# Patient Record
Sex: MALE | Race: OTHER
[De-identification: names, ages, dates, MRNs, and addresses within clinical notes are randomized per-mention and may not be internally consistent; named-entity substitution may affect disease eponyms.]

---

## 2019-04-06 ENCOUNTER — HOSPITAL ENCOUNTER (EMERGENCY)
Dept: HOSPITAL 74 - JER | Age: 34
Discharge: HOME | End: 2019-04-06
Payer: COMMERCIAL

## 2019-04-06 VITALS — DIASTOLIC BLOOD PRESSURE: 82 MMHG | HEART RATE: 89 BPM | SYSTOLIC BLOOD PRESSURE: 123 MMHG

## 2019-04-06 VITALS — BODY MASS INDEX: 29 KG/M2

## 2019-04-06 VITALS — TEMPERATURE: 98.6 F

## 2019-04-06 DIAGNOSIS — K52.9: Primary | ICD-10-CM

## 2019-04-06 LAB
ALBUMIN SERPL-MCNC: 4.3 G/DL (ref 3.4–5)
ALP SERPL-CCNC: 78 U/L (ref 45–117)
ALT SERPL-CCNC: 36 U/L (ref 13–61)
ANION GAP SERPL CALC-SCNC: 9 MMOL/L (ref 8–16)
AST SERPL-CCNC: 21 U/L (ref 15–37)
BASOPHILS # BLD: 0.3 % (ref 0–2)
BILIRUB SERPL-MCNC: 0.6 MG/DL (ref 0.2–1)
BUN SERPL-MCNC: 16 MG/DL (ref 7–18)
CALCIUM SERPL-MCNC: 9 MG/DL (ref 8.5–10.1)
CHLORIDE SERPL-SCNC: 101 MMOL/L (ref 98–107)
CO2 SERPL-SCNC: 28 MMOL/L (ref 21–32)
CREAT SERPL-MCNC: 0.9 MG/DL (ref 0.55–1.3)
DEPRECATED RDW RBC AUTO: 14 % (ref 11.9–15.9)
EOSINOPHIL # BLD: 0.4 % (ref 0–4.5)
GLUCOSE SERPL-MCNC: 154 MG/DL (ref 74–106)
HCT VFR BLD CALC: 43.8 % (ref 35.4–49)
HGB BLD-MCNC: 14.6 GM/DL (ref 11.7–16.9)
LIPASE SERPL-CCNC: 111 U/L (ref 73–393)
LYMPHOCYTES # BLD: 13 % (ref 8–40)
MCH RBC QN AUTO: 26.1 PG (ref 25.7–33.7)
MCHC RBC AUTO-ENTMCNC: 33.3 G/DL (ref 32–35.9)
MCV RBC: 78.4 FL (ref 80–96)
MONOCYTES # BLD AUTO: 5 % (ref 3.8–10.2)
NEUTROPHILS # BLD: 81.3 % (ref 42.8–82.8)
PLATELET # BLD AUTO: 303 K/MM3 (ref 134–434)
PMV BLD: 7.4 FL (ref 7.5–11.1)
POTASSIUM SERPLBLD-SCNC: 3.8 MMOL/L (ref 3.5–5.1)
PROT SERPL-MCNC: 7.8 G/DL (ref 6.4–8.2)
RBC # BLD AUTO: 5.59 M/MM3 (ref 4–5.6)
SODIUM SERPL-SCNC: 138 MMOL/L (ref 136–145)
WBC # BLD AUTO: 15.7 K/MM3 (ref 4–10)

## 2019-04-06 PROCEDURE — 3E033GC INTRODUCTION OF OTHER THERAPEUTIC SUBSTANCE INTO PERIPHERAL VEIN, PERCUTANEOUS APPROACH: ICD-10-PCS

## 2019-04-06 PROCEDURE — 3E033NZ INTRODUCTION OF ANALGESICS, HYPNOTICS, SEDATIVES INTO PERIPHERAL VEIN, PERCUTANEOUS APPROACH: ICD-10-PCS

## 2019-04-06 PROCEDURE — 3E0337Z INTRODUCTION OF ELECTROLYTIC AND WATER BALANCE SUBSTANCE INTO PERIPHERAL VEIN, PERCUTANEOUS APPROACH: ICD-10-PCS

## 2019-04-06 NOTE — PDOC
*Physical Exam





- Vital Signs


 Last Vital Signs











Temp Pulse Resp BP Pulse Ox


 


 97.4 F L  72   18   133/89   100 


 


 04/06/19 04:51  04/06/19 04:51  04/06/19 04:51  04/06/19 04:51  04/06/19 04:51














- Physical Exam


Comments: 





04/06/19 08:34


Patient received 1g of tylenol IV and another bag of NS. He reported feeling 

much better and would like to go home. 





Glen dc the patient with zofran PRN and encourage him to give plenty of bowel 

rest before moving onto liquid diet. Advance diet as needed after he tolerates 

liquid diet. 





<Umesh Moss - Last Filed: 04/06/19 08:36>





- Vital Signs


 Last Vital Signs











Temp Pulse Resp BP Pulse Ox


 


 98.6 F   89   19   123/82   100 


 


 04/06/19 08:50  04/06/19 08:49  04/06/19 08:49  04/06/19 08:49  04/06/19 08:49














<Danelle Pearson - Last Filed: 04/07/19 07:28>





ED Treatment Course





- LABORATORY


CBC & Chemistry Diagram: 


 04/06/19 05:20





 04/06/19 05:20





- ADDITIONAL ORDERS


Additional order review: 


 Laboratory  Results











  04/06/19





  05:20


 


Sodium  138


 


Potassium  3.8


 


Chloride  101


 


Carbon Dioxide  28


 


Anion Gap  9


 


BUN  16


 


Creatinine  0.9


 


Creat Clearance w eGFR  96.03


 


Random Glucose  154 H


 


Calcium  9.0


 


Total Bilirubin  0.6


 


AST  21


 


ALT  36


 


Alkaline Phosphatase  78


 


Total Protein  7.8


 


Albumin  4.3


 


Lipase  111








 











  04/06/19





  05:20


 


RBC  5.59


 


MCV  78.4 L


 


MCHC  33.3


 


RDW  14.0


 


MPV  7.4 L


 


Neutrophils %  81.3


 


Lymphocytes %  13.0


 


Monocytes %  5.0


 


Eosinophils %  0.4


 


Basophils %  0.3














- Medications


Given in the ED: 


ED Medications














Discontinued Medications














Generic Name Dose Route Start Last Admin





  Trade Name Freq  PRN Reason Stop Dose Admin


 


Acetaminophen  1,000 mg  04/06/19 07:31  04/06/19 07:42





  Ofirmev Injection -  IVPB  04/06/19 07:32  1,000 mg





  ONCE ONE   Administration





     





     





     





     


 


Famotidine/Sodium Chloride  20 mg in 50 mls @ 100 mls/hr  04/06/19 05:12  04/06/ 19 05:37





  Pepcid 20 Mg Premixed Ivpb -  IVPB  04/06/19 05:41  100 mls/hr





  ONCE ONE   Administration





     





     





     





     


 


Sodium Chloride  1,000 mls @ 1,000 mls/hr  04/06/19 07:17  04/06/19 07:42





  Normal Saline -  IV  04/06/19 08:16  1,000 mls/hr





  ASDIR STA   Administration





     





     





     





     


 


Ondansetron HCl  4 mg  04/06/19 05:13  04/06/19 05:37





  Zofran Injection  IVPUSH  04/06/19 05:14  4 mg





  ONCE ONE   Administration





     





     





     





     














<Umesh Moss - Last Filed: 04/06/19 08:36>





- LABORATORY


CBC & Chemistry Diagram: 


 04/06/19 05:20





 04/06/19 05:20





- ADDITIONAL ORDERS


Additional order review: 


 











  04/06/19





  05:20


 


RBC  5.59


 


MCV  78.4 L


 


MCHC  33.3


 


RDW  14.0


 


MPV  7.4 L


 


Neutrophils %  81.3


 


Lymphocytes %  13.0


 


Monocytes %  5.0


 


Eosinophils %  0.4


 


Basophils %  0.3














- Medications


Given in the ED: 


ED Medications














Discontinued Medications














Generic Name Dose Route Start Last Admin





  Trade Name Freq  PRN Reason Stop Dose Admin


 


Acetaminophen  1,000 mg  04/06/19 07:31  04/06/19 07:42





  Ofirmev Injection -  IVPB  04/06/19 07:32  1,000 mg





  ONCE ONE   Administration





     





     





     





     


 


Famotidine/Sodium Chloride  20 mg in 50 mls @ 100 mls/hr  04/06/19 05:12  04/06/ 19 05:37





  Pepcid 20 Mg Premixed Ivpb -  IVPB  04/06/19 05:41  100 mls/hr





  ONCE ONE   Administration





     





     





     





     


 


Sodium Chloride  1,000 mls @ 42 mls/hr  04/06/19 05:15  04/06/19 05:37





  Normal Saline -  IV   42 mls/hr





  ASDIR NERY   Administration





     





     





     





     


 


Sodium Chloride  1,000 mls @ 1,000 mls/hr  04/06/19 07:17  04/06/19 07:42





  Normal Saline -  IV  04/06/19 08:16  1,000 mls/hr





  ASDIR STA   Administration





     





     





     





     


 


Ondansetron HCl  4 mg  04/06/19 05:13  04/06/19 05:37





  Zofran Injection  IVPUSH  04/06/19 05:14  4 mg





  ONCE ONE   Administration





     





     





     





     














<Danelle Pearson Suzejordan - Last Filed: 04/07/19 07:28>





Medical Decision Making





- Medical Decision Making





04/07/19 07:26





pt was signed out from Dr Ryan at 7AM


reviewed HPI, labs and workup. 33 YOM c/o n/v/d, on reassessment, mild 

epigastric discomfort.


labs with leukocytosis of 15K, nonspecific, likely in the setting of AGE. 

abdomen nonperitonoeal, he is nontoxic appearing


given pepcid and fluids initially, improving. adding on tylenol for analgesia


on reassessment, VS wnl, no fever or hypotension or tachycardia


well appearing. leo PO intake


rx zofran for nausea


supportive care, bland diet, adv as tolerated


pcp followup, return precautions








<PearsonDanelle Garcia - Last Filed: 04/07/19 07:28>





*DC/Admit/Observation/Transfer





- Discharge Dispostion


Decision to Admit order: No





<Umesh Moss - Last Filed: 04/06/19 08:36>





<LiliDanelle Garcia - Last Filed: 04/07/19 07:28>


Diagnosis at time of Disposition: 


 Gastroenteritis








- Discharge Dispostion


Disposition: HOME


Condition at time of disposition: Stable





- Prescriptions


Prescriptions: 


Ondansetron [Zofran -] 8 mg PO PRN PRN #8 tablet


 PRN Reason: Nausea And/Or Vomiting





- Patient Instructions


Printed Discharge Instructions:  DI for Viral Gastroenteritis -- Adult


Additional Instructions: 


You were evaluated in the ED for nausea, vomiting and stomach pain. It's likely 

due to gastroenteritis (stomach flu). You received fluids,  pain medication as 

well as pepcid. Please have plenty of bowel rest before moving onto liquid 

diet. You may advance diet once you tolerate liquid diet. 





Return to the ED if you experience worsening stomach pain, fever, chills.





Anti-nausea/vomiting medication has been sent to your pharmacy (zofran 8mg PRN)





Take it as needed.











- Post Discharge Activity


Forms/Work/School Notes:  Back to Work
Attending Attestation





- Resident


Resident Name: Dahlia Silveira





- ED Attending Attestation


I have performed the following: I have examined & evaluated the patient, The 

case was reviewed & discussed with the resident, I agree w/resident's findings 

& plan





- HPI


HPI: 





04/06/19 06:34


Pt comes with 10 episodes of diarrhea and 3 episodes of vomiting.  He had eated 

a bread stuffed with potatoes.


Pt's wife and mother in law ate the same but didn't get ill. Pt works at a gas 

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 He has  a 2nd job at a [employer], didnt work today.


Pt has no PMHx.


He appears weak. Afebrile, but cheeks are flushed.





- Physicial Exam


PE: 





04/06/19 06:36


Agree with resident exam. 


Afebrile.


Abd soft NT ND lungs clear. Heart normal rate and rhythm.


Pt has no flank pain and no dysuria.








- Medical Decision Making





04/06/19 06:37


Labs pending. Pt will be hydrated and reevaluated.





04/06/19 06:53


WBC is elevated, left shift 80%


Pt will be signed out to the day team.  He may be discharged once he is 

improved.
History of Present Illness





- General


Chief Complaint: Nausea/Vomiting


Stated Complaint: ABD PAIN


Time Seen by Provider: 04/06/19 05:11





- History of Present Illness


Initial Comments: 





04/06/19 05:11


33 year old man without significant history who presents with diffuse abdominal 

pain and crampign that started at 0200 today and associated with 3x nbnb 

vomiting. The patient ate some fried bread and potato that his family also ate, 

however he was the only that has had these symptoms. 











Past History





- Past Medical History


Allergies/Adverse Reactions: 


 Allergies











Allergy/AdvReac Type Severity Reaction Status Date / Time


 


No Known Allergies Allergy   Verified 04/06/19 05:39











Home Medications: 


Ambulatory Orders





Ondansetron [Zofran -] 8 mg PO PRN PRN #8 tablet 04/06/19 








COPD: No





- Suicide/Smoking/Psychosocial Hx


Smoking History: Never smoked


Hx Alcohol Use: Yes (SOCIAL)


Drug/Substance Use Hx: No





**Review of Systems





- Review of Systems


Able to Perform ROS?: Yes


Is the patient limited English proficient: No


Constitutional: No: Chills, Diaphoresis, Fever


HEENTM: No: Blurred Vision, Tinnitus


Respiratory: No: Cough, Orthopnea, Shortness of Breath


ABD/GI: Yes: Nausea, Vomiting.  No: Constipated, Diarrhea


: No: Burning, Dysuria, Frequency


Musculoskeletal: No: Back Pain, Muscle Pain


Neurological: No: Headache, Numbness, Seizure





*Physical Exam





- Vital Signs


 Last Vital Signs











Temp Pulse Resp BP Pulse Ox


 


 97.4 F L  72   18   133/89   100 


 


 04/06/19 04:51  04/06/19 04:51  04/06/19 04:51  04/06/19 04:51  04/06/19 04:51














- Physical Exam


Comments: 





04/06/19 05:17


GENERAL: Awake, alert, and fully oriented, in no acute distress


HEAD: No signs of trauma, normocephalic, atraumatic 


EYES:  EOMI, sclera anicteric, conjunctiva clear


ENT: oropharynx clear without exudates. Moist mucosa


NECK: Normal ROM, supple


LUNGS: No distress, speaks full sentences, clear to auscultation bilaterally 


HEART: Regular rate and rhythm, normal S1 and S2, no murmurs, rubs or gallops, 

peripheral pulses normal and equal bilaterally. 


ABDOMEN: Soft, nontender, normoactive bowel sounds.  No guarding, no rebound.  

No masses


EXTREMITIES : Normal inspection, Normal range of motion, no edema.  No clubbing 

or cyanosis. 


NEUROLOGICAL: Cranial nerves II through XII grossly intact.  Normal speech, no 

focal sensorimotor deficits 


SKIN: Warm, Dry, normal turgor, no rashes or lesions noted











ED Treatment Course





- LABORATORY


CBC & Chemistry Diagram: 


 04/06/19 05:20





 04/06/19 05:20





Medical Decision Making





- Medical Decision Making





04/06/19 05:19


33 year old man without significant history who presents with diffuse abdominal 

pain and crampign that started at 0200 today and associated with 3x nbnb 

vomiting. The patient ate some fried bread and potato that his family also ate, 

however he was the only that has had these symptoms. 


ED Course:


r/o acs


viral vs pancreatisi vs gerd vs gastroenteritis 





cbc, cmp, lipase, cxr, ekg





patient signed out to resident Dr. Moss





*DC/Admit/Observation/Transfer


Diagnosis at time of Disposition: 


 Gastroenteritis








- Discharge Dispostion


Disposition: HOME


Condition at time of disposition: Stable





- Prescriptions


Prescriptions: 


Ondansetron [Zofran -] 8 mg PO PRN PRN #8 tablet


 PRN Reason: Nausea And/Or Vomiting





- Referrals





- Patient Instructions


Printed Discharge Instructions:  DI for Viral Gastroenteritis -- Adult


Additional Instructions: 


You were evaluated in the ED for nausea, vomiting and stomach pain. It's likely 

due to gastroenteritis (stomach flu). You received fluids,  pain medication as 

well as pepcid. Please have plenty of bowel rest before moving onto liquid 

diet. You may advance diet once you tolerate liquid diet. 





Return to the ED if you experience worsening stomach pain, fever, chills.





Anti-nausea/vomiting medication has been sent to your pharmacy (zofran 8mg PRN)





Take it as needed.











- Post Discharge Activity


Forms/Work/School Notes:  Back to Work
General

## 2019-04-06 NOTE — EKG
Test Reason : 

Blood Pressure : ***/*** mmHG

Vent. Rate : 074 BPM     Atrial Rate : 074 BPM

   P-R Int : 162 ms          QRS Dur : 090 ms

    QT Int : 390 ms       P-R-T Axes : 046 027 006 degrees

   QTc Int : 432 ms

 

NORMAL SINUS RHYTHM

NORMAL ECG

NO PREVIOUS ECGS AVAILABLE

Confirmed by REJI ANAYA MD ([id number]) on 4/6/2019 11:29:54 AM

 

Referred By:             Confirmed By:REJI ANAYA MD

## 2022-12-28 ENCOUNTER — HOSPITAL ENCOUNTER (EMERGENCY)
Dept: HOSPITAL 74 - FER | Age: 37
Discharge: ANOTHER HOSPITAL | End: 2022-12-28
Payer: COMMERCIAL

## 2022-12-28 VITALS — RESPIRATION RATE: 18 BRPM

## 2022-12-28 VITALS — BODY MASS INDEX: 28.7 KG/M2

## 2022-12-28 VITALS — HEART RATE: 112 BPM | SYSTOLIC BLOOD PRESSURE: 118 MMHG | DIASTOLIC BLOOD PRESSURE: 84 MMHG | TEMPERATURE: 99.4 F

## 2022-12-28 DIAGNOSIS — K83.09: Primary | ICD-10-CM

## 2022-12-28 LAB
ALBUMIN SERPL-MCNC: 3.7 G/DL (ref 3.4–5)
ALP SERPL-CCNC: 61 U/L (ref 45–117)
ALT SERPL-CCNC: 39 U/L (ref 13–61)
ANION GAP SERPL CALC-SCNC: 10 MMOL/L (ref 8–16)
AST SERPL-CCNC: 30 U/L (ref 15–37)
BILIRUB SERPL-MCNC: 2.8 MG/DL (ref 0.2–1)
BUN SERPL-MCNC: 11 MG/DL (ref 7–18)
CALCIUM SERPL-MCNC: 8.6 MG/DL (ref 8.5–10)
CHLORIDE SERPL-SCNC: 94 MMOL/L (ref 98–107)
CO2 SERPL-SCNC: 26 MMOL/L (ref 21–32)
CREAT SERPL-MCNC: 1 MG/DL (ref 0.55–1.3)
DEPRECATED RDW RBC AUTO: 15.3 % (ref 11.9–15.9)
GLUCOSE SERPL-MCNC: 146 MG/DL (ref 74–106)
HCT VFR BLD CALC: 46 % (ref 35.4–49)
HGB BLD-MCNC: 15.6 G/DL (ref 11.7–16.9)
LIPASE SERPL-CCNC: 84 U/L (ref 73–393)
MAGNESIUM SERPL-MCNC: 2.1 MG/DL (ref 1.8–2.4)
MCH RBC QN AUTO: 26.9 PG (ref 25.7–33.7)
MCHC RBC AUTO-ENTMCNC: 33.9 G/DL (ref 32–35.9)
MCV RBC: 79.4 FL (ref 80–96)
PLATELET # BLD AUTO: 274 10^3/UL (ref 134–434)
PLATELET BLD QL SMEAR: ADEQUATE
PMV BLD: 7.5 FL (ref 7.5–11.1)
PROT SERPL-MCNC: 7.4 G/DL (ref 6.4–8.2)
RBC # BLD AUTO: 5.79 10^6/UL (ref 4–5.6)
SODIUM SERPL-SCNC: 130 MMOL/L (ref 136–145)
WBC # BLD AUTO: 21.7 10^3/UL (ref 4–10.8)

## 2022-12-28 PROCEDURE — 3E03329 INTRODUCTION OF OTHER ANTI-INFECTIVE INTO PERIPHERAL VEIN, PERCUTANEOUS APPROACH: ICD-10-PCS

## 2022-12-28 PROCEDURE — 3E0337Z INTRODUCTION OF ELECTROLYTIC AND WATER BALANCE SUBSTANCE INTO PERIPHERAL VEIN, PERCUTANEOUS APPROACH: ICD-10-PCS

## 2022-12-28 PROCEDURE — 3E0333Z INTRODUCTION OF ANTI-INFLAMMATORY INTO PERIPHERAL VEIN, PERCUTANEOUS APPROACH: ICD-10-PCS

## 2025-08-13 PROBLEM — Z00.00 ENCOUNTER FOR PREVENTIVE HEALTH EXAMINATION: Status: ACTIVE | Noted: 2025-08-13

## 2025-08-14 ENCOUNTER — APPOINTMENT (OUTPATIENT)
Dept: CARDIOLOGY | Facility: CLINIC | Age: 40
End: 2025-08-14
Payer: COMMERCIAL

## 2025-08-14 VITALS
SYSTOLIC BLOOD PRESSURE: 120 MMHG | BODY MASS INDEX: 26.63 KG/M2 | TEMPERATURE: 98.2 F | DIASTOLIC BLOOD PRESSURE: 90 MMHG | WEIGHT: 186 LBS | HEART RATE: 88 BPM | OXYGEN SATURATION: 98 % | RESPIRATION RATE: 18 BRPM | HEIGHT: 70 IN

## 2025-08-14 DIAGNOSIS — L71.9 ROSACEA, UNSPECIFIED: ICD-10-CM

## 2025-08-14 DIAGNOSIS — Z83.3 FAMILY HISTORY OF DIABETES MELLITUS: ICD-10-CM

## 2025-08-14 DIAGNOSIS — Z72.3 LACK OF PHYSICAL EXERCISE: ICD-10-CM

## 2025-08-14 DIAGNOSIS — I10 ESSENTIAL (PRIMARY) HYPERTENSION: ICD-10-CM

## 2025-08-14 DIAGNOSIS — R01.1 CARDIAC MURMUR, UNSPECIFIED: ICD-10-CM

## 2025-08-14 DIAGNOSIS — Z78.9 OTHER SPECIFIED HEALTH STATUS: ICD-10-CM

## 2025-08-14 LAB
ALBUMIN SERPL ELPH-MCNC: 5 G/DL
ALP BLD-CCNC: 74 U/L
ALT SERPL-CCNC: 62 U/L
ANION GAP SERPL CALC-SCNC: 12 MMOL/L
APPEARANCE: CLEAR
AST SERPL-CCNC: 42 U/L
BASOPHILS # BLD AUTO: 0.05 K/UL
BASOPHILS NFR BLD AUTO: 0.8 %
BILIRUB SERPL-MCNC: 1.1 MG/DL
BILIRUBIN URINE: NEGATIVE
BLOOD URINE: NEGATIVE
BUN SERPL-MCNC: 12 MG/DL
CALCIUM SERPL-MCNC: 10.1 MG/DL
CHLORIDE SERPL-SCNC: 103 MMOL/L
CHOLEST SERPL-MCNC: 215 MG/DL
CO2 SERPL-SCNC: 24 MMOL/L
COLOR: NORMAL
CREAT SERPL-MCNC: 0.89 MG/DL
EGFRCR SERPLBLD CKD-EPI 2021: 110 ML/MIN/1.73M2
EOSINOPHIL # BLD AUTO: 0.15 K/UL
EOSINOPHIL NFR BLD AUTO: 2.3 %
GLUCOSE QUALITATIVE U: NEGATIVE MG/DL
GLUCOSE SERPL-MCNC: 117 MG/DL
HCT VFR BLD CALC: 49 %
HDLC SERPL-MCNC: 48 MG/DL
HGB BLD-MCNC: 15.7 G/DL
IMM GRANULOCYTES NFR BLD AUTO: 0.8 %
KETONES URINE: NEGATIVE MG/DL
LDLC SERPL-MCNC: 134 MG/DL
LEUKOCYTE ESTERASE URINE: NEGATIVE
LYMPHOCYTES # BLD AUTO: 2.47 K/UL
LYMPHOCYTES NFR BLD AUTO: 38.3 %
MAN DIFF?: NORMAL
MCHC RBC-ENTMCNC: 26.3 PG
MCHC RBC-ENTMCNC: 32 G/DL
MCV RBC AUTO: 81.9 FL
MONOCYTES # BLD AUTO: 0.72 K/UL
MONOCYTES NFR BLD AUTO: 11.2 %
NEUTROPHILS # BLD AUTO: 3.01 K/UL
NEUTROPHILS NFR BLD AUTO: 46.6 %
NITRITE URINE: NEGATIVE
NONHDLC SERPL-MCNC: 167 MG/DL
PH URINE: 7
PLATELET # BLD AUTO: 293 K/UL
POTASSIUM SERPL-SCNC: 5.3 MMOL/L
PROT SERPL-MCNC: 7.9 G/DL
PROTEIN URINE: NORMAL MG/DL
RBC # BLD: 5.98 M/UL
RBC # FLD: 14 %
SODIUM SERPL-SCNC: 140 MMOL/L
SPECIFIC GRAVITY URINE: 1.03
TRIGL SERPL-MCNC: 182 MG/DL
UROBILINOGEN URINE: 1 MG/DL
WBC # FLD AUTO: 6.45 K/UL

## 2025-08-14 PROCEDURE — 99203 OFFICE O/P NEW LOW 30 MIN: CPT | Mod: 25

## 2025-08-14 PROCEDURE — 93000 ELECTROCARDIOGRAM COMPLETE: CPT

## 2025-08-14 RX ORDER — AMLODIPINE BESYLATE 10 MG/1
10 TABLET ORAL
Refills: 0 | Status: ACTIVE | COMMUNITY

## 2025-09-19 ENCOUNTER — APPOINTMENT (OUTPATIENT)
Dept: CARDIOLOGY | Facility: CLINIC | Age: 40
End: 2025-09-19
Payer: COMMERCIAL

## 2025-09-19 PROCEDURE — ZZZZZ: CPT | Mod: NC

## 2025-09-19 PROCEDURE — 93015 CV STRESS TEST SUPVJ I&R: CPT

## 2025-09-19 PROCEDURE — 93306 TTE W/DOPPLER COMPLETE: CPT
